# Patient Record
Sex: FEMALE | Race: WHITE | Employment: FULL TIME | URBAN - METROPOLITAN AREA
[De-identification: names, ages, dates, MRNs, and addresses within clinical notes are randomized per-mention and may not be internally consistent; named-entity substitution may affect disease eponyms.]

---

## 2018-09-24 ENCOUNTER — TELEPHONE (OUTPATIENT)
Dept: DERMATOLOGY | Facility: CLINIC | Age: 28
End: 2018-09-24

## 2018-09-24 NOTE — TELEPHONE ENCOUNTER
Left message for patient regarding upcoming appointment on September 27th at 7:30 a.m.  Informed patient to bring an updated list of allergies, medications, pharmacy details and insurance information. Asked patient to bring any dermatology records from outside [facility] or the [facility] or have them faxed to [phone].    Patient Reminders Given:  --Plan on being in our facility for approximately one hour, this includes the registration process, office visit, education and check-out process.  If you are having a procedure, more time may be required.     --We are located on the second floor of the building, check in desk D.   --If you need to cancel or reschedule, call [phone].  --We look forward to seeing you in Dermatology Clinic.     Christal Rodriguez LPN

## 2018-09-27 ENCOUNTER — OFFICE VISIT (OUTPATIENT)
Dept: DERMATOLOGY | Facility: CLINIC | Age: 28
End: 2018-09-27
Payer: COMMERCIAL

## 2018-09-27 DIAGNOSIS — L81.4 SOLAR LENTIGO: ICD-10-CM

## 2018-09-27 DIAGNOSIS — D22.9 MULTIPLE BENIGN NEVI: ICD-10-CM

## 2018-09-27 DIAGNOSIS — L57.8 SUN-DAMAGED SKIN: Primary | ICD-10-CM

## 2018-09-27 DIAGNOSIS — Z80.8 FAMILY HISTORY OF MELANOMA: ICD-10-CM

## 2018-09-27 PROBLEM — R79.89 ELEVATED TSH: Status: ACTIVE | Noted: 2017-09-18

## 2018-09-27 PROCEDURE — 99203 OFFICE O/P NEW LOW 30 MIN: CPT | Performed by: DERMATOLOGY

## 2018-09-27 ASSESSMENT — PAIN SCALES - GENERAL: PAINLEVEL: NO PAIN (0)

## 2018-09-27 NOTE — NURSING NOTE
@Odalys Farfan's goals for this visit include:   Chief Complaint   Patient presents with     Skin Check     Family history of melanoma       She requests these members of her care team be copied on today's visit information: NO    PCP: No primary care provider on file.    Referring Provider:  No referring provider defined for this encounter.    There were no vitals taken for this visit.    Do you need any medication refills at today's visit? NO    Smiley Montero [facility]

## 2018-09-27 NOTE — PROGRESS NOTES
[facility] Dermatology Note      Dermatology Problem List:  1.Family history of melanoma  -maternal grandfather, possibly her sister    Encounter Date: Sep 27, 2018    CC:  Chief Complaint   Patient presents with     Skin Check     Family history of melanoma         History of Present Illness:  Ms. Odalys Farfan is a 28 year old female who presents in self referral for a skin exam. Her grandpa told her to her to come in and get her skin exam. He had his skin cancer treatment here - she believes he was told he had a melanoma but it was the best kind of melanoma to have because it does not spread into the lymph nodes. It was on his ear. She knows that her sister has a scar on her abdomen from a skin cancer, but is unsure as to what type. No areas of concern for her. She doesn't feel she is tan. Patient uses a lotion daily on her face only that has sunscreen in it she thinks SPF 15. She does not wear sunscreen on her body. No other concerns today.     Past Medical History:   There is no problem list on file for this patient.    No past medical history on file.  No past surgical history on file.    Social History:   reports that she has been smoking Cigarettes.  She started smoking about 6 years ago. She has been smoking about 0.00 packs per day for the past 2.00 years. She has never used smokeless tobacco. Smokes a few cigarettes a year. Works for an  - . Spends a lot of time outdoors when she is not working - hike, bike, walk, mowing.     Family History:  Family History   Problem Relation Age of Onset     Diabetes Mother      Thyroid Disease Sister      Cancer Paternal Grandfather      Melanoma Maternal Grandfather      Hypertension No family hx of      Cerebrovascular Disease No family hx of      Glaucoma No family hx of      Macular Degeneration No family hx of    Maternal grandfather - history of melanoma. Possibly in her sister, she is not sure.      Medications:  Current Outpatient Prescriptions   Medication Sig Dispense Refill     SUMATRIPTAN SUCCINATE PO Take 25 mg by mouth as needed for migraine         No Known Allergies    Review of Systems:  -Constitutional: Patient is otherwise feeling well, in usual state of health.   -Skin: As above in HPI. No additional skin concerns.    Physical exam:  Vitals: There were no vitals taken for this visit.  GEN: This is a well developed, well-nourished female in no acute distress, in a pleasant mood.    SKIN: Total skin excluding the undergarment areas was performed. The exam included the head/face, neck, both arms, chest, back, abdomen, both legs, digits and/or nails and buttocks.  -Brooks Type II  -Patient is very tanned in sun exposed areas today  -Scattered brown macules on sun exposed areas.  -Multiple regular brown pigmented macules and papules are identified on the back and trunk. All have uniform pigmentation and no asymmetry.  -No other lesions of concern on areas examined.       Impression/Plan:  1. Family history of skin cancer    Patient thinks both grandfather and sister both had melanoma. She will clarify this information for us.     ABCDs of melanoma were discussed and self skin checks were advised.     Advised yearly skin checks if she does confirm family history of melanoma    2. Sun damaged skin with solar lentigines and active vega    Sun precaution was advised including the use of sun screens of SPF 30 or higher, sun protective clothing, and avoidance of tanning beds. Reapply every two hours.     3. Multiple clinically benign nevi: No atypia    No further intervention required. Patient to report changes.     Patient reassured of the benign nature of these lesions.    Follow-up in 1 year, earlier for new or changing lesions.     Staff Involved:  Scribe/Staff    Scribe Disclosure  I, Mary Roach, am serving as a scribe to document services personally performed by Dr. Kemi Rausch MD,  based on data collection and the provider's statements to me.     Provider Disclosure:   The documentation recorded by the scribe accurately reflects the services I personally performed and the decisions made by me.    Kemi Rausch MD    Department of Dermatology  [facility]: Phone: [phone], Fax:[phone]  [facility]: Phone: [phone], Fax: [phone]

## 2018-09-27 NOTE — MR AVS SNAPSHOT
After Visit Summary   2018    Odalys Farfan    MRN: [id number]           Patient Information     Date Of Birth          1990        Visit Information        Provider Department      2018 7:30 AM Kemi Rausch MD [facility]        Today's Diagnoses     Sun-damaged skin    -  1    Solar lentigo        Multiple benign nevi        Family history of melanoma           Follow-ups after your visit        Follow-up notes from your care team     Return in about 1 year (around 2019).      Who to contact     If you have questions or need follow up information about today's clinic visit or your schedule please contact [facility] directly at [phone].  Normal or non-critical lab and imaging results will be communicated to you by MyChart, letter or phone within 4 business days after the clinic has received the results. If you do not hear from us within 7 days, please contact the clinic through MyChart or phone. If you have a critical or abnormal lab result, we will notify you by phone as soon as possible.  Submit refill requests through [email or web address] or call your pharmacy and they will forward the refill request to us. Please allow 3 business days for your refill to be completed.          Additional Information About Your Visit        MyChart Information     [email or web address] is an electronic gateway that provides easy, online access to your medical records. With [email or web address], you can request a clinic appointment, read your test results, renew a prescription or communicate with your care team.     To sign up for [email or web address] visit the website at [email or web address]   You will be asked to enter the access code listed below, as well as some personal information. Please follow the directions to create your username and password.     Your access code is: [email or web address]  Expires: 2018  7:59 AM     Your access code will  in 90 days. If you need help or a new code,  please contact your [facility] or call [phone] for assistance.        Care EveryWhere ID     This is your Care EveryWhere ID. This could be used by other organizations to access your [place] medical records  [id number]         Blood Pressure from Last 3 Encounters:   No data found for BP    Weight from Last 3 Encounters:   No data found for Wt              Today, you had the following     No orders found for display       Primary Care Provider    None Specified       No primary provider on file.        Equal Access to Services     RAFAT CUMMINS : Hadii aad ku hadasho Soomaali, waaxda luqadaha, qaybta kaalmada adeegyada, waxay idiin hayzorann amariliseg preethienrikegem madrid . So [facility] [phone].    ATENCIÓN: Si habla español, tiene a molina disposición servicios gratuitos de asistencia lingüística. Llame al [phone].    We comply with applicable federal civil rights laws and [place] laws. We do not discriminate on the basis of race, color, national origin, age, disability, sex, sexual orientation, or gender identity.            Thank you!     Thank you for choosing [facility]  for your care. Our goal is always to provide you with excellent care. Hearing back from our patients is one way we can continue to improve our services. Please take a few minutes to complete the written survey that you may receive in the mail after your visit with us. Thank you!             Your Updated Medication List - Protect others around you: Learn how to safely use, store and throw away your medicines at www.disposemymeds.org.          This list is accurate as of 9/27/18  7:59 AM.  Always use your most recent med list.                   Brand Name Dispense Instructions for use Diagnosis    SUMATRIPTAN SUCCINATE PO      Take 25 mg by mouth as needed for migraine

## 2020-02-28 ENCOUNTER — OFFICE VISIT (OUTPATIENT)
Dept: DERMATOLOGY | Facility: CLINIC | Age: 30
End: 2020-02-28
Payer: COMMERCIAL

## 2020-02-28 DIAGNOSIS — D48.5 NEOPLASM OF UNCERTAIN BEHAVIOR OF SKIN: ICD-10-CM

## 2020-02-28 DIAGNOSIS — L72.0 MILIUM: ICD-10-CM

## 2020-02-28 DIAGNOSIS — Z80.8 FAMILY HISTORY OF MELANOMA: Primary | ICD-10-CM

## 2020-02-28 DIAGNOSIS — D22.9 MULTIPLE BENIGN NEVI: ICD-10-CM

## 2020-02-28 PROCEDURE — 88305 TISSUE EXAM BY PATHOLOGIST: CPT | Mod: TC | Performed by: DERMATOLOGY

## 2020-02-28 PROCEDURE — 99213 OFFICE O/P EST LOW 20 MIN: CPT | Mod: 25 | Performed by: DERMATOLOGY

## 2020-02-28 PROCEDURE — 10040 EXTRACTION: CPT | Performed by: DERMATOLOGY

## 2020-02-28 PROCEDURE — 11102 TANGNTL BX SKIN SINGLE LES: CPT | Performed by: DERMATOLOGY

## 2020-02-28 RX ORDER — LEVOTHYROXINE SODIUM 25 UG/1
TABLET ORAL
COMMUNITY
Start: 2019-04-05

## 2020-02-28 RX ORDER — LEVOTHYROXINE SODIUM 50 UG/1
50 TABLET ORAL
COMMUNITY

## 2020-02-28 ASSESSMENT — PAIN SCALES - GENERAL: PAINLEVEL: NO PAIN (0)

## 2020-02-28 NOTE — PROGRESS NOTES
[facility] Dermatology Note      Dermatology Problem List:  1.Family history of melanoma  -maternal grandfather, possibly her sister    2. NUB, L axilla  -s/p biopsy 02/28/20    Last TBSE: 9/27/2018    Encounter Date: Feb 28, 2020    CC:  Chief Complaint   Patient presents with     Derm Problem     Spot on left temple, abdomen, mole spot under left armpit. No personal hx of SC, family hx of SC (melanoma)         History of Present Illness:  Ms. Odalys Farfan is a 29 year old female who presents as a follow up for a spot check. Patient was last seen on 9/27/2018 by Dr. Rausch. Today patient reports spots of concern on the:    Left temple. Bothersome and patient would like removed. Appeared after childbirth.    Under left armpit. History of catching and rubbing.     Patient reports no concerns on the abdomen.      Hx of recent childbirth. No personal hx of skin cancer. No other concerns.     Past Medical History:   Patient Active Problem List   Diagnosis     Elevated TSH     Migraine syndrome     No past medical history on file.  No past surgical history on file.    Social History:   reports that she has quit smoking. Her smoking use included cigarettes. She started smoking about 8 years ago. She smoked 0.00 packs per day for 2.00 years. She has never used smokeless tobacco. Smokes a few cigarettes a year. Works for an  - . Spends a lot of time outdoors when she is not working - hike, bike, walk, mowing.     Family History:  Family History   Problem Relation Age of Onset     Diabetes Mother      Thyroid Disease Sister      Cancer Paternal Grandfather      Melanoma Maternal Grandfather      Hypertension No family hx of      Cerebrovascular Disease No family hx of      Glaucoma No family hx of      Macular Degeneration No family hx of    Maternal grandfather - history of melanoma. Possibly in her sister, she is not sure.     Medications:  Current Outpatient  Medications   Medication Sig Dispense Refill     levothyroxine (SYNTHROID/LEVOTHROID) 50 MCG tablet Take 50 mcg by mouth       levothyroxine (SYNTHROID/LEVOTHROID) 25 MCG tablet TK 1 T PO B CHRISTI       SUMATRIPTAN SUCCINATE PO Take 25 mg by mouth as needed for migraine         No Known Allergies    Review of Systems:  -Constitutional: Patient is otherwise feeling well, in usual state of health.   -Skin: As above in HPI. No additional skin concerns.  -OB: recent childbirth. Currently breastfeeding small amounts, 1 oz.     Physical exam:  Vitals: There were no vitals taken for this visit.  GEN: This is a well developed, well-nourished female in no acute distress, in a pleasant mood.    SKIN: Total skin excluding the undergarment areas was performed. The exam included the head/face, neck, both arms, chest, back, abdomen, both legs, digits and/or nails and buttocks. Declines genital exam.   -Brooks Type II  -There is a white 1-2 mm papule on the left temple.   -Brown pedunculated papule, left axilla  -Multiple regular brown pigmented macules and papules are identified on the trunk and extremities.   -No other lesions of concern on areas examined.       Impression/Plan:  1. Family history of melanoma - grandmother  -ABCDs of melanoma were discussed and self skin checks were advised.     2. Milium, L temple   -area nicked with an 11 blade. Vaseline and a band-aid applied    3. NUB, Brown pedunculated papule, left axilla Ddx: nevus vs other- history of catching and rubbing  -Shave biopsy:  After discussion of benefits and risks including but not limited to bleeding/bruising, pain/swelling, infection, scar, incomplete removal, nerve damage/numbness, recurrence, and non-diagnostic biopsy, written consent, verbal consent and photographs were obtained. Time-out was performed. The area was cleaned with isopropyl alcohol. 0.5mL of 1% lidocaine with epinephrine was injected to obtain adequate anesthesia of the lesion on the  left axilla. A shave biopsy was performed. Hemostasis was achieved with aluminium chloride. Vaseline and a sterile dressing were applied. The patient tolerated the procedure and no complications were noted. The patient was provided with verbal and written post care instructions.     4. Clinically benign nevi, trunk and extremities  -No further intervention needed at this time. Patient to report changes.      Follow-up in 1 year, earlier for new or changing lesions.     Staff Involved:  Scribe/Staff    Scribe Disclosure  I, Gagandeep Alejandro, am serving as a scribe to document services personally performed by Dr. Maura Rollins MD, based on data collection and the provider's statements to me.    Provider Disclosure:   The documentation recorded by the scribe accurately reflects the services I personally performed and the decisions made by me.    Maura Rollins MD    Department of Dermatology  [facility]: Phone: [phone], Fax:[phone]  [facility]: Phone: [phone], Fax: [phone]

## 2020-02-28 NOTE — LETTER
2/28/2020         RE: Odalys Farfan  [address]  [place] [address]        Dear Colleague,    Thank you for referring your patient, Odalys Farfan, to the [facility]. Please see a copy of my visit note below.    [facility] Dermatology Note      Dermatology Problem List:  1.Family history of melanoma  -maternal grandfather, possibly her sister    2. NUB, L axilla  -s/p biopsy 02/28/20    Last TBSE: 9/27/2018    Encounter Date: Feb 28, 2020    CC:  Chief Complaint   Patient presents with     Derm Problem     Spot on left temple, abdomen, mole spot under left armpit. No personal hx of SC, family hx of SC (melanoma)         History of Present Illness:  Ms. Odalys Farfan is a 29 year old female who presents as a follow up for a spot check. Patient was last seen on 9/27/2018 by Dr. Rausch. Today patient reports spots of concern on the:    Left temple. Bothersome and patient would like removed. Appeared after childbirth.    Under left armpit. History of catching and rubbing.     Patient reports no concerns on the abdomen.      Hx of recent childbirth. No personal hx of skin cancer. No other concerns.     Past Medical History:   Patient Active Problem List   Diagnosis     Elevated TSH     Migraine syndrome     No past medical history on file.  No past surgical history on file.    Social History:   reports that she has quit smoking. Her smoking use included cigarettes. She started smoking about 8 years ago. She smoked 0.00 packs per day for 2.00 years. She has never used smokeless tobacco. Smokes a few cigarettes a year. Works for an  - . Spends a lot of time outdoors when she is not working - hike, bike, walk, mowing.     Family History:  Family History   Problem Relation Age of Onset     Diabetes Mother      Thyroid Disease Sister      Cancer Paternal Grandfather      Melanoma Maternal Grandfather      Hypertension No family hx of       Cerebrovascular Disease No family hx of      Glaucoma No family hx of      Macular Degeneration No family hx of    Maternal grandfather - history of melanoma. Possibly in her sister, she is not sure.     Medications:  Current Outpatient Medications   Medication Sig Dispense Refill     levothyroxine (SYNTHROID/LEVOTHROID) 50 MCG tablet Take 50 mcg by mouth       levothyroxine (SYNTHROID/LEVOTHROID) 25 MCG tablet TK 1 T PO B CHRISTI       SUMATRIPTAN SUCCINATE PO Take 25 mg by mouth as needed for migraine         No Known Allergies    Review of Systems:  -Constitutional: Patient is otherwise feeling well, in usual state of health.   -Skin: As above in HPI. No additional skin concerns.  -OB: recent childbirth. Currently breastfeeding small amounts, 1 oz.     Physical exam:  Vitals: There were no vitals taken for this visit.  GEN: This is a well developed, well-nourished female in no acute distress, in a pleasant mood.    SKIN: Total skin excluding the undergarment areas was performed. The exam included the head/face, neck, both arms, chest, back, abdomen, both legs, digits and/or nails and buttocks. Declines genital exam.   -Brooks Type II  -There is a white 1-2 mm papule on the left temple.   -Brown pedunculated papule, left axilla  -Multiple regular brown pigmented macules and papules are identified on the trunk and extremities.   -No other lesions of concern on areas examined.       Impression/Plan:  1. Family history of melanoma - grandmother  -ABCDs of melanoma were discussed and self skin checks were advised.     2. Milium, L temple   -area nicked with an 11 blade. Vaseline and a band-aid applied    3. NUB, Brown pedunculated papule, left axilla Ddx: nevus vs other- history of catching and rubbing  -Shave biopsy:  After discussion of benefits and risks including but not limited to bleeding/bruising, pain/swelling, infection, scar, incomplete removal, nerve damage/numbness, recurrence, and non-diagnostic  biopsy, written consent, verbal consent and photographs were obtained. Time-out was performed. The area was cleaned with isopropyl alcohol. 0.5mL of 1% lidocaine with epinephrine was injected to obtain adequate anesthesia of the lesion on the left axilla. A shave biopsy was performed. Hemostasis was achieved with aluminium chloride. Vaseline and a sterile dressing were applied. The patient tolerated the procedure and no complications were noted. The patient was provided with verbal and written post care instructions.     4. Clinically benign nevi, trunk and extremities  -No further intervention needed at this time. Patient to report changes.      Follow-up in 1 year, earlier for new or changing lesions.     Staff Involved:  Scribe/Staff    Scribe Disclosure  I, Gagandeep Alejandro, am serving as a scribe to document services personally performed by Dr. Maura Rollins MD, based on data collection and the provider's statements to me.    Provider Disclosure:   The documentation recorded by the scribe accurately reflects the services I personally performed and the decisions made by me.    Maura Rollins MD    Department of Dermatology  [facility]: Phone: [phone], Fax:[phone]  [facility]: Phone: [phone], Fax: [phone]                      Again, thank you for allowing me to participate in the care of your patient.        Sincerely,        Maura Rollins MD

## 2020-02-28 NOTE — NURSING NOTE
Odalys Farfan's goals for this visit include:   Chief Complaint   Patient presents with     Derm Problem     Spot on left temple, abdomen, mole spot under left armpit. No personal hx of SC, family hx of SC (melanoma)     She requests these members of her care team be copied on today's visit information:     PCP: No Ref-Primary, Physician    Referring Provider:  No referring provider defined for this encounter.    There were no vitals taken for this visit.    Do you need any medication refills at today's visit? No    Christal Rodriguez LPN

## 2020-02-28 NOTE — LETTER
"Ms.Paige BLAISE Farfan  [address]  [place] [address]        March 4, 2020    Dear Odalys Farfan,     We are writing to inform you of your test results that show a normal mole.     Thank you for taking the time to be seen in our dermatology clinic. If you have further questions or concerns, please contact the clinic(see phone number listed below).       Sincerely,     Maura Rollins MD      Department of Dermatology   [facility]: Phone: [phone], Fax:[phone]   [facility]: Phone: [phone], Fax: [phone]     Resulted Orders   Dermatological path order and indications   Result Value Ref Range    Copath Report       Patient Name: ODALYS FARFAN  MR#: [id number]  Specimen #: [id number]  Collected: 2/28/2020  Received: 2/28/2020  Reported: 3/3/2020 08:14  Ordering Phy(s): MAURA ROLLINS    For improved result formatting, select 'View Enhanced Report Format' under   Linked Documents section.    SPECIMEN(S):  Skin, left axilla, shave    FINAL DIAGNOSIS:  Skin, left axilla, shave:  - Intradermal melanocytic nevus - (see description)    I have personally reviewed all specimens  and/or slides, including the   listed special stains, and used them  with my medical judgement to determine or confirm the final diagnosis.    Electronically signed out by:  Kong Mireles M.D., [facility]    CLINICAL HISTORY:  The patient is a 29 year-old female.    GROSS:  The specimen is received in formalin with proper patient identification,   labeled \"L axilla\".  The specimen  consists of a 0.3 x 0.2 cm skin shave which displays a 0.3 x 0.2 x 0.2 cm   tan-brown, raised, wrinkled lesion.  The resection margin is inked blue an d the specimen is submitted in A1.   (Dictated by: FABY Armstrong  3/2/2020 08:57 AM)    MICROSCOPIC:  The specimen exhibits an intradermal proliferation of nests and cords of   melanocytes which mature " with  descent, without a significant junctional component. The lesion extends to   the deep margin. Tangential  sectioning complicates interpretation.    The technical component of this testing was completed at the [facility], with the professional component performed   at the [facility], [address] [place] [phone] ([phone])    CPT Codes:  A: 60344-NX3.P, 47400-AU6.T    COLLECTION SITE:  Client: [facility]  Location: [place] ()

## 2020-02-28 NOTE — NURSING NOTE
The following medication was given:     MEDICATION:  Lidocaine with epinephrine 1% 1:[id number]  ROUTE: SQ  SITE: see procedure note  DOSE: 0.5cc  LOT #: -[id number]  : [place]  EXPIRATION DATE: 1-nov-2020  NDC#: 2260-7980-74   Was there drug waste? 1.5cc  Multi-dose vial: Yes    Rhonda Muller LPN  February 28, 2020

## 2020-02-28 NOTE — PATIENT INSTRUCTIONS

## 2020-03-03 LAB — COPATH REPORT: NORMAL

## 2020-03-04 ENCOUNTER — TELEPHONE (OUTPATIENT)
Dept: DERMATOLOGY | Facility: CLINIC | Age: 30
End: 2020-03-04

## 2020-03-04 NOTE — TELEPHONE ENCOUNTER
Notes recorded by Smiley Montero CMA on 3/4/2020 at 7:38 AM CST  Letter mailed.    Smiley Montero CMA    ------    Notes recorded by Maura Rollins MD on 3/3/2020 at 6:15 PM CST  Dear Odalys Farfan,    We are writing to inform you of your test results that show a normal mole.     Thank you for taking the time to be seen in our dermatology clinic. If you have further questions or concerns, please contact the clinic(see phone number listed below).      Sincerely,    Maura Rollins MD    Department of Dermatology  [facility]: Phone: [phone], Fax:[phone]  [facility]: Phone: [phone], Fax: [phone]

## 2020-08-03 ENCOUNTER — TELEPHONE (OUTPATIENT)
Dept: DERMATOLOGY | Facility: CLINIC | Age: 30
End: 2020-08-03

## 2020-08-03 NOTE — LETTER
November 11, 2020      Odalys Farfan  [address] [place] [address]        Dear Odalys Farfan,    In order to ensure that we are providing the best quality care, we would like to remind you that you are due for a return in person appointment with Dr Rollins around 2/28/21.      We have been unable to reach you by phone. Please call your clinic or use [facility] to make an appointment with your provider before you run out of medication.  Please let us know if you have any questions and we would be happy to help.     Thank you for trusting us with your care.    Sincerely,     [facility]  [phone]

## 2020-08-03 NOTE — TELEPHONE ENCOUNTER
8/3 Provided phone number [phone] to schedule in about 1 year (around 2/28/2021) for skin exam.     Ayla Dalton   Procedure    Ortho/Sports Med/Pod/Ent/Eye/[facility]urgical Specialties  [facility] [place]   [phone]

## 2020-10-28 NOTE — TELEPHONE ENCOUNTER
2nd attempt to schedule as noted below. Message left for patient to return call and schedule.    Jessica Day  Surgical Specialties Procedure   [facility] [place]  10/28/2020 11:20 AM

## 2020-11-11 NOTE — TELEPHONE ENCOUNTER
3rd attempt. Patient has not called to schedule.  Appointment reminder letter sent to patient.      Jessica Day  Surgical Specialties Procedure   [facility] [place]  11/11/2020 7:58 AM

## 2022-07-20 ENCOUNTER — LAB REQUISITION (OUTPATIENT)
Dept: LAB | Facility: CLINIC | Age: 32
End: 2022-07-20
Payer: COMMERCIAL

## 2022-07-20 DIAGNOSIS — E03.9 HYPOTHYROIDISM, UNSPECIFIED: ICD-10-CM

## 2022-07-20 DIAGNOSIS — Z36.9 ENCOUNTER FOR ANTENATAL SCREENING, UNSPECIFIED: ICD-10-CM

## 2022-07-20 LAB
ABO/RH(D): NORMAL
ANTIBODY SCREEN: NEGATIVE
SPECIMEN EXPIRATION DATE: NORMAL
SPECIMEN EXPIRATION DATE: NORMAL

## 2022-07-20 PROCEDURE — 87389 HIV-1 AG W/HIV-1&-2 AB AG IA: CPT | Mod: ORL | Performed by: ADVANCED PRACTICE MIDWIFE

## 2022-07-20 PROCEDURE — 86592 SYPHILIS TEST NON-TREP QUAL: CPT | Mod: ORL | Performed by: ADVANCED PRACTICE MIDWIFE

## 2022-07-20 PROCEDURE — 87491 CHLMYD TRACH DNA AMP PROBE: CPT | Mod: ORL | Performed by: ADVANCED PRACTICE MIDWIFE

## 2022-07-20 PROCEDURE — 87340 HEPATITIS B SURFACE AG IA: CPT | Mod: ORL | Performed by: ADVANCED PRACTICE MIDWIFE

## 2022-07-20 PROCEDURE — 86850 RBC ANTIBODY SCREEN: CPT | Mod: ORL | Performed by: ADVANCED PRACTICE MIDWIFE

## 2022-07-20 PROCEDURE — 87086 URINE CULTURE/COLONY COUNT: CPT | Mod: ORL | Performed by: ADVANCED PRACTICE MIDWIFE

## 2022-07-20 PROCEDURE — 86803 HEPATITIS C AB TEST: CPT | Mod: ORL | Performed by: ADVANCED PRACTICE MIDWIFE

## 2022-07-20 PROCEDURE — 86762 RUBELLA ANTIBODY: CPT | Mod: ORL | Performed by: ADVANCED PRACTICE MIDWIFE

## 2022-07-20 PROCEDURE — 84443 ASSAY THYROID STIM HORMONE: CPT | Mod: ORL | Performed by: ADVANCED PRACTICE MIDWIFE

## 2022-07-20 PROCEDURE — 86901 BLOOD TYPING SEROLOGIC RH(D): CPT | Mod: ORL | Performed by: ADVANCED PRACTICE MIDWIFE

## 2022-07-20 PROCEDURE — 84480 ASSAY TRIIODOTHYRONINE (T3): CPT | Mod: ORL | Performed by: ADVANCED PRACTICE MIDWIFE

## 2022-07-20 PROCEDURE — 84439 ASSAY OF FREE THYROXINE: CPT | Mod: ORL | Performed by: ADVANCED PRACTICE MIDWIFE

## 2022-07-22 LAB
C TRACH DNA SPEC QL PROBE+SIG AMP: NEGATIVE
HBV SURFACE AG SERPL QL IA: NONREACTIVE
HCV AB SERPL QL IA: NONREACTIVE
HIV 1+2 AB+HIV1 P24 AG SERPL QL IA: NONREACTIVE
N GONORRHOEA DNA SPEC QL NAA+PROBE: NEGATIVE
RPR SER QL: NONREACTIVE
RUBV IGG SERPL QL IA: 4.67 INDEX
RUBV IGG SERPL QL IA: POSITIVE
T3 SERPL-MCNC: 92 NG/DL (ref 85–202)
T4 FREE SERPL-MCNC: 1.12 NG/DL (ref 0.9–1.7)
TSH SERPL DL<=0.005 MIU/L-ACNC: 1.51 UIU/ML (ref 0.3–4.2)

## 2022-07-23 LAB — BACTERIA UR CULT: NO GROWTH

## 2022-08-30 ENCOUNTER — LAB REQUISITION (OUTPATIENT)
Dept: LAB | Facility: CLINIC | Age: 32
End: 2022-08-30

## 2022-08-30 DIAGNOSIS — E03.9 HYPOTHYROIDISM, UNSPECIFIED: ICD-10-CM

## 2022-08-30 DIAGNOSIS — Z34.93 ENCOUNTER FOR SUPERVISION OF NORMAL PREGNANCY, UNSPECIFIED, THIRD TRIMESTER: ICD-10-CM

## 2022-08-30 LAB
ERYTHROCYTE [DISTWIDTH] IN BLOOD BY AUTOMATED COUNT: 13.2 % (ref 10–15)
HCT VFR BLD AUTO: 36.2 % (ref 35–47)
HGB BLD-MCNC: 12.2 G/DL (ref 11.7–15.7)
MCH RBC QN AUTO: 30.2 PG (ref 26.5–33)
MCHC RBC AUTO-ENTMCNC: 33.7 G/DL (ref 31.5–36.5)
MCV RBC AUTO: 90 FL (ref 78–100)
PLATELET # BLD AUTO: 244 10E3/UL (ref 150–450)
RBC # BLD AUTO: 4.04 10E6/UL (ref 3.8–5.2)
T4 FREE SERPL-MCNC: 0.99 NG/DL (ref 0.9–1.7)
TSH SERPL DL<=0.005 MIU/L-ACNC: 3.98 UIU/ML (ref 0.3–4.2)
WBC # BLD AUTO: 8.1 10E3/UL (ref 4–11)

## 2022-08-30 PROCEDURE — 84439 ASSAY OF FREE THYROXINE: CPT | Performed by: ADVANCED PRACTICE MIDWIFE

## 2022-08-30 PROCEDURE — 84443 ASSAY THYROID STIM HORMONE: CPT | Performed by: ADVANCED PRACTICE MIDWIFE

## 2022-08-30 PROCEDURE — 85014 HEMATOCRIT: CPT | Performed by: ADVANCED PRACTICE MIDWIFE

## 2022-09-19 ENCOUNTER — TRANSCRIBE ORDERS (OUTPATIENT)
Dept: OTHER | Age: 32
End: 2022-09-19

## 2022-09-19 DIAGNOSIS — E03.9 HYPOTHYROIDISM, UNSPECIFIED TYPE: Primary | ICD-10-CM

## 2022-11-08 ENCOUNTER — LAB REQUISITION (OUTPATIENT)
Dept: LAB | Facility: CLINIC | Age: 32
End: 2022-11-08

## 2022-11-08 DIAGNOSIS — E03.9 HYPOTHYROIDISM, UNSPECIFIED: ICD-10-CM

## 2022-11-08 PROCEDURE — 84480 ASSAY TRIIODOTHYRONINE (T3): CPT | Performed by: ADVANCED PRACTICE MIDWIFE

## 2022-11-08 PROCEDURE — 84443 ASSAY THYROID STIM HORMONE: CPT | Performed by: ADVANCED PRACTICE MIDWIFE

## 2022-11-08 PROCEDURE — 84439 ASSAY OF FREE THYROXINE: CPT | Performed by: ADVANCED PRACTICE MIDWIFE

## 2022-11-09 LAB
HOLD SPECIMEN: NORMAL
T3 SERPL-MCNC: 130 NG/DL (ref 85–202)
T4 FREE SERPL-MCNC: 0.83 NG/DL (ref 0.9–1.7)
TSH SERPL DL<=0.005 MIU/L-ACNC: 0.97 UIU/ML (ref 0.3–4.2)

## 2022-12-12 ENCOUNTER — LAB REQUISITION (OUTPATIENT)
Dept: LAB | Facility: CLINIC | Age: 32
End: 2022-12-12

## 2022-12-12 DIAGNOSIS — E03.9 HYPOTHYROIDISM, UNSPECIFIED: ICD-10-CM

## 2022-12-12 LAB — TSH SERPL DL<=0.005 MIU/L-ACNC: 1.51 UIU/ML (ref 0.3–4.2)

## 2022-12-12 PROCEDURE — 84443 ASSAY THYROID STIM HORMONE: CPT | Performed by: ADVANCED PRACTICE MIDWIFE

## 2022-12-12 PROCEDURE — 84439 ASSAY OF FREE THYROXINE: CPT | Performed by: ADVANCED PRACTICE MIDWIFE

## 2022-12-13 LAB — T4 FREE SERPL-MCNC: 0.86 NG/DL (ref 0.9–1.7)

## 2023-01-26 ENCOUNTER — LAB REQUISITION (OUTPATIENT)
Dept: LAB | Facility: CLINIC | Age: 33
End: 2023-01-26
Payer: COMMERCIAL

## 2023-01-26 DIAGNOSIS — Z36.89 ENCOUNTER FOR OTHER SPECIFIED ANTENATAL SCREENING: ICD-10-CM

## 2023-01-26 PROCEDURE — 87653 STREP B DNA AMP PROBE: CPT | Mod: ORL | Performed by: ADVANCED PRACTICE MIDWIFE

## 2023-01-28 LAB — GP B STREP DNA SPEC QL NAA+PROBE: NEGATIVE

## 2023-02-02 ENCOUNTER — LAB REQUISITION (OUTPATIENT)
Dept: LAB | Facility: CLINIC | Age: 33
End: 2023-02-02
Payer: COMMERCIAL

## 2023-02-02 DIAGNOSIS — E03.9 HYPOTHYROIDISM, UNSPECIFIED: ICD-10-CM

## 2023-02-02 LAB
T4 FREE SERPL-MCNC: 0.9 NG/DL (ref 0.9–1.7)
TSH SERPL DL<=0.005 MIU/L-ACNC: 0.96 UIU/ML (ref 0.3–4.2)

## 2023-02-02 PROCEDURE — 84443 ASSAY THYROID STIM HORMONE: CPT | Mod: ORL | Performed by: ADVANCED PRACTICE MIDWIFE

## 2023-02-02 PROCEDURE — 84439 ASSAY OF FREE THYROXINE: CPT | Mod: ORL | Performed by: ADVANCED PRACTICE MIDWIFE

## 2023-03-23 ENCOUNTER — LAB REQUISITION (OUTPATIENT)
Dept: LAB | Facility: CLINIC | Age: 33
End: 2023-03-23
Payer: COMMERCIAL

## 2023-03-23 DIAGNOSIS — E03.9 HYPOTHYROIDISM, UNSPECIFIED: ICD-10-CM

## 2023-03-23 DIAGNOSIS — Z12.4 ENCOUNTER FOR SCREENING FOR MALIGNANT NEOPLASM OF CERVIX: ICD-10-CM

## 2023-03-23 PROCEDURE — G0145 SCR C/V CYTO,THINLAYER,RESCR: HCPCS | Mod: ORL | Performed by: ADVANCED PRACTICE MIDWIFE

## 2023-03-23 PROCEDURE — 87624 HPV HI-RISK TYP POOLED RSLT: CPT | Mod: ORL | Performed by: ADVANCED PRACTICE MIDWIFE

## 2023-03-23 PROCEDURE — 84443 ASSAY THYROID STIM HORMONE: CPT | Mod: ORL | Performed by: ADVANCED PRACTICE MIDWIFE

## 2023-03-23 PROCEDURE — 84439 ASSAY OF FREE THYROXINE: CPT | Mod: ORL | Performed by: ADVANCED PRACTICE MIDWIFE

## 2023-03-24 LAB
T4 FREE SERPL-MCNC: 1.79 NG/DL (ref 0.9–1.7)
TSH SERPL DL<=0.005 MIU/L-ACNC: 0.38 UIU/ML (ref 0.3–4.2)

## 2023-03-29 LAB
BKR LAB AP GYN ADEQUACY: NORMAL
BKR LAB AP GYN INTERPRETATION: NORMAL
BKR LAB AP HPV REFLEX: NORMAL
BKR LAB AP LMP: NORMAL
BKR LAB AP PREVIOUS ABNL DX: NORMAL
BKR LAB AP PREVIOUS ABNORMAL: NORMAL
PATH REPORT.COMMENTS IMP SPEC: NORMAL
PATH REPORT.COMMENTS IMP SPEC: NORMAL
PATH REPORT.RELEVANT HX SPEC: NORMAL

## 2023-03-31 LAB
HUMAN PAPILLOMA VIRUS 16 DNA: NEGATIVE
HUMAN PAPILLOMA VIRUS 18 DNA: NEGATIVE
HUMAN PAPILLOMA VIRUS FINAL DIAGNOSIS: NORMAL
HUMAN PAPILLOMA VIRUS OTHER HR: NEGATIVE

## 2025-05-29 ENCOUNTER — LAB REQUISITION (OUTPATIENT)
Dept: LAB | Facility: CLINIC | Age: 35
End: 2025-05-29
Payer: COMMERCIAL

## 2025-05-29 ENCOUNTER — LAB REQUISITION (OUTPATIENT)
Dept: LAB | Facility: CLINIC | Age: 35
End: 2025-05-29

## 2025-05-29 DIAGNOSIS — N83.201 UNSPECIFIED OVARIAN CYST, RIGHT SIDE: ICD-10-CM

## 2025-05-29 DIAGNOSIS — R10.2 PELVIC AND PERINEAL PAIN: ICD-10-CM

## 2025-05-29 LAB
BASOPHILS # BLD AUTO: 0 10E3/UL (ref 0–0.2)
BASOPHILS NFR BLD AUTO: 0 %
EOSINOPHIL # BLD AUTO: 0 10E3/UL (ref 0–0.7)
EOSINOPHIL NFR BLD AUTO: 0 %
ERYTHROCYTE [DISTWIDTH] IN BLOOD BY AUTOMATED COUNT: 14.1 % (ref 10–15)
HCT VFR BLD AUTO: 40.2 % (ref 35–47)
HGB BLD-MCNC: 13 G/DL (ref 11.7–15.7)
IMM GRANULOCYTES # BLD: 0 10E3/UL
IMM GRANULOCYTES NFR BLD: 0 %
LYMPHOCYTES # BLD AUTO: 1.2 10E3/UL (ref 0.8–5.3)
LYMPHOCYTES NFR BLD AUTO: 13 %
MCH RBC QN AUTO: 28 PG (ref 26.5–33)
MCHC RBC AUTO-ENTMCNC: 32.3 G/DL (ref 31.5–36.5)
MCV RBC AUTO: 87 FL (ref 78–100)
MONOCYTES # BLD AUTO: 0.5 10E3/UL (ref 0–1.3)
MONOCYTES NFR BLD AUTO: 6 %
NEUTROPHILS # BLD AUTO: 7.3 10E3/UL (ref 1.6–8.3)
NEUTROPHILS NFR BLD AUTO: 80 %
NRBC # BLD AUTO: 0 10E3/UL
NRBC BLD AUTO-RTO: 0 /100
PLATELET # BLD AUTO: 244 10E3/UL (ref 150–450)
RBC # BLD AUTO: 4.64 10E6/UL (ref 3.8–5.2)
WBC # BLD AUTO: 9.2 10E3/UL (ref 4–11)

## 2025-05-29 PROCEDURE — 85004 AUTOMATED DIFF WBC COUNT: CPT | Performed by: ADVANCED PRACTICE MIDWIFE

## 2025-05-29 PROCEDURE — 87563 M. GENITALIUM AMP PROBE: CPT | Mod: ORL | Performed by: ADVANCED PRACTICE MIDWIFE

## 2025-06-02 LAB
M GENITALIUM DNA SPEC QL NAA+PROBE: NOT DETECTED
M HOMINIS DNA SPEC QL NAA+PROBE: NOT DETECTED
U PARVUM DNA SPEC QL NAA+PROBE: NOT DETECTED
U UREALYTICUM DNA SPEC QL NAA+PROBE: NOT DETECTED